# Patient Record
Sex: MALE | Race: BLACK OR AFRICAN AMERICAN | Employment: UNEMPLOYED | URBAN - METROPOLITAN AREA
[De-identification: names, ages, dates, MRNs, and addresses within clinical notes are randomized per-mention and may not be internally consistent; named-entity substitution may affect disease eponyms.]

---

## 2021-07-13 ENCOUNTER — ANESTHESIA EVENT (OUTPATIENT)
Dept: ENDOSCOPY | Age: 36
End: 2021-07-13
Payer: MEDICARE

## 2021-07-13 RX ORDER — SODIUM CHLORIDE 0.9 % (FLUSH) 0.9 %
5-40 SYRINGE (ML) INJECTION AS NEEDED
Status: CANCELLED | OUTPATIENT
Start: 2021-07-13

## 2021-07-13 RX ORDER — SODIUM CHLORIDE, SODIUM LACTATE, POTASSIUM CHLORIDE, CALCIUM CHLORIDE 600; 310; 30; 20 MG/100ML; MG/100ML; MG/100ML; MG/100ML
100 INJECTION, SOLUTION INTRAVENOUS CONTINUOUS
Status: CANCELLED | OUTPATIENT
Start: 2021-07-13

## 2021-07-13 RX ORDER — SODIUM CHLORIDE 0.9 % (FLUSH) 0.9 %
5-40 SYRINGE (ML) INJECTION EVERY 8 HOURS
Status: CANCELLED | OUTPATIENT
Start: 2021-07-13

## 2021-07-14 ENCOUNTER — HOSPITAL ENCOUNTER (OUTPATIENT)
Age: 36
Setting detail: OUTPATIENT SURGERY
Discharge: NURSING, REHAB OR LONG-TERM CARE | End: 2021-07-14
Attending: INTERNAL MEDICINE | Admitting: INTERNAL MEDICINE
Payer: MEDICARE

## 2021-07-14 ENCOUNTER — ANESTHESIA (OUTPATIENT)
Dept: ENDOSCOPY | Age: 36
End: 2021-07-14
Payer: MEDICARE

## 2021-07-14 VITALS
TEMPERATURE: 96.8 F | DIASTOLIC BLOOD PRESSURE: 82 MMHG | RESPIRATION RATE: 16 BRPM | HEART RATE: 94 BPM | OXYGEN SATURATION: 97 % | SYSTOLIC BLOOD PRESSURE: 103 MMHG

## 2021-07-14 PROCEDURE — 74011000250 HC RX REV CODE- 250: Performed by: NURSE ANESTHETIST, CERTIFIED REGISTERED

## 2021-07-14 PROCEDURE — 74011250636 HC RX REV CODE- 250/636: Performed by: ANESTHESIOLOGY

## 2021-07-14 PROCEDURE — 76060000031 HC ANESTHESIA FIRST 0.5 HR: Performed by: INTERNAL MEDICINE

## 2021-07-14 PROCEDURE — 2709999900 HC NON-CHARGEABLE SUPPLY: Performed by: INTERNAL MEDICINE

## 2021-07-14 PROCEDURE — 76040000025: Performed by: INTERNAL MEDICINE

## 2021-07-14 PROCEDURE — C1726 CATH, BAL DIL, NON-VASCULAR: HCPCS | Performed by: INTERNAL MEDICINE

## 2021-07-14 PROCEDURE — 74011250636 HC RX REV CODE- 250/636: Performed by: NURSE ANESTHETIST, CERTIFIED REGISTERED

## 2021-07-14 PROCEDURE — 74011250636 HC RX REV CODE- 250/636

## 2021-07-14 RX ORDER — SODIUM CHLORIDE 9 MG/ML
10 INJECTION, SOLUTION INTRAVENOUS CONTINUOUS
Status: DISCONTINUED | OUTPATIENT
Start: 2021-07-14 | End: 2021-07-14 | Stop reason: HOSPADM

## 2021-07-14 RX ORDER — LIDOCAINE HYDROCHLORIDE 20 MG/ML
INJECTION, SOLUTION EPIDURAL; INFILTRATION; INTRACAUDAL; PERINEURAL AS NEEDED
Status: DISCONTINUED | OUTPATIENT
Start: 2021-07-14 | End: 2021-07-14 | Stop reason: HOSPADM

## 2021-07-14 RX ORDER — PROPOFOL 10 MG/ML
INJECTION, EMULSION INTRAVENOUS
Status: DISCONTINUED | OUTPATIENT
Start: 2021-07-14 | End: 2021-07-14 | Stop reason: HOSPADM

## 2021-07-14 RX ORDER — PROPOFOL 10 MG/ML
INJECTION, EMULSION INTRAVENOUS AS NEEDED
Status: DISCONTINUED | OUTPATIENT
Start: 2021-07-14 | End: 2021-07-14 | Stop reason: HOSPADM

## 2021-07-14 RX ORDER — MIDAZOLAM HYDROCHLORIDE 1 MG/ML
INJECTION, SOLUTION INTRAMUSCULAR; INTRAVENOUS
Status: COMPLETED
Start: 2021-07-14 | End: 2021-07-14

## 2021-07-14 RX ORDER — SODIUM CHLORIDE, SODIUM LACTATE, POTASSIUM CHLORIDE, CALCIUM CHLORIDE 600; 310; 30; 20 MG/100ML; MG/100ML; MG/100ML; MG/100ML
100 INJECTION, SOLUTION INTRAVENOUS CONTINUOUS
Status: DISCONTINUED | OUTPATIENT
Start: 2021-07-14 | End: 2021-07-14 | Stop reason: HOSPADM

## 2021-07-14 RX ORDER — MIDAZOLAM HYDROCHLORIDE 1 MG/ML
6 INJECTION, SOLUTION INTRAMUSCULAR; INTRAVENOUS ONCE
Status: COMPLETED | OUTPATIENT
Start: 2021-07-14 | End: 2021-07-14

## 2021-07-14 RX ADMIN — PROPOFOL 200 MCG/KG/MIN: 10 INJECTION, EMULSION INTRAVENOUS at 12:22

## 2021-07-14 RX ADMIN — SODIUM CHLORIDE, SODIUM LACTATE, POTASSIUM CHLORIDE, AND CALCIUM CHLORIDE: 600; 310; 30; 20 INJECTION, SOLUTION INTRAVENOUS at 12:22

## 2021-07-14 RX ADMIN — LIDOCAINE HYDROCHLORIDE 80 MG: 20 INJECTION, SOLUTION EPIDURAL; INFILTRATION; INTRACAUDAL; PERINEURAL at 12:22

## 2021-07-14 RX ADMIN — MIDAZOLAM 6 MG: 1 INJECTION INTRAMUSCULAR; INTRAVENOUS at 11:38

## 2021-07-14 RX ADMIN — SODIUM CHLORIDE, SODIUM LACTATE, POTASSIUM CHLORIDE, AND CALCIUM CHLORIDE 100 ML/HR: 600; 310; 30; 20 INJECTION, SOLUTION INTRAVENOUS at 12:20

## 2021-07-14 RX ADMIN — PROPOFOL 100 MG: 10 INJECTION, EMULSION INTRAVENOUS at 12:22

## 2021-07-14 RX ADMIN — MIDAZOLAM HYDROCHLORIDE 6 MG: 1 INJECTION, SOLUTION INTRAMUSCULAR; INTRAVENOUS at 11:38

## 2021-07-14 NOTE — PROGRESS NOTES
Dr. Ian Del Castillo and this RN obtained telephone consent of emergency blood administration by mother, Jayde Bui.

## 2021-07-14 NOTE — PROGRESS NOTES
Contact mother and obtained consent for procedure via telephone with [facility].

## 2021-07-14 NOTE — PROGRESS NOTES
Attempted to contact Mother, Cande Rahulnery, with listed contact number to obtain telephone consent for procedure. Unable to reach mother after multiple attempts.

## 2021-07-14 NOTE — ANESTHESIA PREPROCEDURE EVALUATION
Relevant Problems   No relevant active problems       Anesthetic History   No history of anesthetic complications            Review of Systems / Medical History  Patient summary reviewed and pertinent labs reviewed    Pulmonary        Sleep apnea (as child)    Asthma : well controlled    Comments: Chronic cough   Neuro/Psych     seizures ( last \"years ago\" per MOM): well controlled         Cardiovascular      Valvular problems/murmurs (mild TR)            Exercise tolerance: <4 METS  Comments: WPW syndrome--asymptomatic   GI/Hepatic/Renal     GERD: well controlled           Endo/Other             Other Findings   Comments: Sotos Syndrome/macrosomia  Dysphagia with known aspiration; only drinks thickened liquids    Pancytopenia due to carbamazepine    Had valium 10 mg dose this am (this is his am and noon dose combined)    Noncommunicative    Phone conversation with Mother. Pt is not a JW, but mother is a JW. Mother previously consented to give blood in emergency and again consents today and states patient is NOT JW. Physical Exam    Airway            Comments: Macrosomia  Unable to cooperate with exam Cardiovascular    Rhythm: regular  Rate: normal         Dental    Dentition: Poor dentition     Pulmonary  Breath sounds clear to auscultation               Abdominal         Other Findings            Anesthetic Plan    ASA: 3  Anesthesia type: total IV anesthesia          Induction: Intravenous  Anesthetic plan and risks discussed with: Patient and Mother      Due to severe learning disability; pt will need to have IM sedative prior to starting IV. Will give 6 mg versed IM. Plan TIVA but GA discussed with Mom if needed.

## 2021-07-14 NOTE — PROGRESS NOTES
Patient's mother called wanting update on procedure. Explained that everything went well and that Dr. Jayne Lowery dilated the esophagus where he found 2 strictures.

## 2021-07-14 NOTE — DISCHARGE INSTRUCTIONS
Gastrointestinal Esophagogastroduodenoscopy (EGD) - Upper Exam Discharge Instructions    1. Call Dr. Jayne Lowery for any problems or questions. 2. Contact the doctor's office for follow up appointment as directed. 3. Medication may cause drowsiness for several hours, therefore:  · Do not drive or operate machinery for remainder of the day. · No alcohol today. · Do not make any important or legal decisions for 24 hours. · Do not sign any legal documents for 24 hours. 5. Ordinarily, you may resume regular diet and activity after exam unless otherwise              specified by your physician. 6. For mild soreness in your throat you may use Cepacol throat lozenges or warm               salt-water gargles as needed. Any additional instructions:    1. Soft diet today. 2. Advance diet tomorrow as tolerated. 3. Omeprazole 40 mg daily. 4. Avoid NSAIDs for 48 hours. 5. Return to office in 2-3 months. Instructions given to Neno Sawyer and other family members.

## 2021-07-14 NOTE — ANESTHESIA POSTPROCEDURE EVALUATION
Procedure(s):  ESOPHAGOGASTRODUODENOSCOPY (EGD)  ESOPHAGEAL DILATION / BMI 20.    total IV anesthesia    Anesthesia Post Evaluation      Multimodal analgesia: multimodal analgesia used between 6 hours prior to anesthesia start to PACU discharge  Patient location during evaluation: bedside  Patient participation: complete - patient participated  Level of consciousness: awake  Pain management: adequate  Airway patency: patent  Anesthetic complications: no  Cardiovascular status: acceptable and stable  Respiratory status: acceptable and room air  Hydration status: acceptable  Post anesthesia nausea and vomiting:  none  Final Post Anesthesia Temperature Assessment:  Normothermia (36.0-37.5 degrees C)      INITIAL Post-op Vital signs:   Vitals Value Taken Time   /66 07/14/21 1254   Temp 36 °C (96.8 °F) 07/14/21 1236   Pulse 102 07/14/21 1306   Resp 16 07/14/21 1244   SpO2 97 % 07/14/21 1306   Vitals shown include unvalidated device data.

## 2021-07-14 NOTE — H&P
History and Physical for Procedure             Date: 7/14/2021     History of Present Illness:  Patient presents to undergo EGD. Past Medical History:   Diagnosis Date    Anemia     Asthma     PRN INHALERS    Dysphagia     Hx aspiration;     Esophageal stricture     GERD (gastroesophageal reflux disease)     Impulse control disorder     Kyphosis     and scoliosis    Macrostomia     Mental retardation     IQ 23-25 per [facility] records    Mitral valve disorder     last echo in care everywhere- 2020-- EF 55%    Pica     Scoliosis     Seizures (HCC)      Past Surgical History:   Procedure Laterality Date    HX HEENT  1980    T&A    HX HEENT  1978    Laser eye     HX ORTHOPAEDIC  unsure    left hand sx    HX OTHER SURGICAL  2020    egd with dilation     In and  Family History   Problem Relation Age of Onset    Asthma Father     Hypertension Maternal Aunt     Diabetes Maternal Aunt     Cancer Maternal Grandfather     Diabetes Maternal Grandfather     Hypertension Maternal Grandfather      Social History     Tobacco Use    Smoking status: Never Smoker    Smokeless tobacco: Never Used   Substance Use Topics    Alcohol use: No        Allergies   Allergen Reactions    Latex Rash    Carbamazepine Unknown (comments)     On list from [facility]    Clonidine Other (comments)     Makes hyper      Depakote [Divalproex] Unknown (comments)    Mellaril Unable to Obtain     Reverse reaction      Pcn [Penicillins] Rash    Ritalin [Methylphenidate] Other (comments)     Makes hyper    Sulfa (Sulfonamide Antibiotics) Unknown (comments)     On list from [facility]    Thioridazine Unknown (comments)    Valproic Acid Other (comments)     \"Shuts down pancreas\"        Review of Systems:  A detailed 10 organ review of systems is obtained with pertinent positives as listed in the History of Present Illness. All others are negative.     Objective:     Physical Exam:  There were no vitals taken for this visit. General:  Alert and oriented. Heart: Regular rate and rhythm  Lungs:  Clear to auscultation bilaterally  Abdomen: Soft, nontender, nondistended    Impression/Plan:     Proceed with EGD as planned. I have discussed with the patient the technique, benefits, alternatives, and risks of these procedures, including medication reaction, immediate or delayed bleeding, or perforation of the gastrointestinal tract.      Signed By: Chuck Phillips MD     July 14, 2021

## 2021-07-14 NOTE — ROUTINE PROCESS
VSS. No complaints noted. Education given and reviewed with caregivers. Patient wheeled out via wheelchair by Jaret, [facility].

## 2021-07-14 NOTE — OP NOTES
Procedure:  Esophagogastroduodenoscopy    Date of Procedure:  7/14/2021    Patient:  Neno Sawyer     1985    Indication:    Sedation:  MAC    Pre-Procedure Physical Exam:    Mental status:  alert and oriented  Airway:  normal oropharyngeal airway and neck mobility  CV:  regular rate and rhythm  Respiratory:  clear to auscultation    Procedure:  A History and Physical has been performed, and patient medication allergies have been reviewed. Risks of perforation, hemorrhage, adverse drug reaction, and aspiration were discussed. Informed consent was obtained for the procedure, including sedation. The patient was placed in the left lateral decubitus position. The heart rate, oxygen saturations, blood pressure, and response to care were monitored throughout the procedure. The gastroscope was passed through the mouth and advanced under direct vision to the second portion of the duodenum. A careful inspection was made as the gastroscope was withdrawn, including a retroflexed view of the proximal stomach. The patient tolerated the procedure well. Findings:     OROPHARYNX: Cords and pyriform recesses appear normal.   ESOPHAGUS: Two strictures were seen in the esophagus, one in the proximal esophagus and one just proximal to the EGJ. CRE balloon dilation was performed to maximum inner diameter 15 mm. Successful dilation was confirmed by the presence of mucosal disruption. STOMACH: On retroflexion, the flap-valve is classified as Hill Grade I, with a normal, prominent tissue fold at the lesser curvature closely approximated to the endoscope. The fundus on antegrade and retroflexed views is normal. The body, antrum, and pylorus are normal.   DUODENUM: The bulb and second portions are normal.    Specimen:  No    Estimated Blood Loss:  Minimal    Implant:  None           Impression:    Esophageal strictures. Plan:  1. Soft diet today. 2. Advance diet tomorrow as tolerated.   3. Omeprazole 40 mg daily. 4. Avoid NSAIDs for 48 hours. 5. Return to office in 2-3 months.      Signed:  Kam Garner MD  7/14/2021  12:33 PM

## (undated) DEVICE — CANNULA NSL ORAL AD FOR CAPNOFLEX CO2 O2 AIRLFE

## (undated) DEVICE — CONNECTOR TBNG OD5-7MM O2 END DISP

## (undated) DEVICE — BLOCK BITE AD 60FR W/ VELC STRP ADDRESSES MOST PT AND

## (undated) DEVICE — ESOPHAGEAL BALLOON DILATATION CATHETER: Brand: CRE FIXED WIRE

## (undated) DEVICE — KENDALL RADIOLUCENT FOAM MONITORING ELECTRODE RECTANGULAR SHAPE: Brand: KENDALL